# Patient Record
Sex: MALE | Race: WHITE | NOT HISPANIC OR LATINO | Employment: FULL TIME | URBAN - METROPOLITAN AREA
[De-identification: names, ages, dates, MRNs, and addresses within clinical notes are randomized per-mention and may not be internally consistent; named-entity substitution may affect disease eponyms.]

---

## 2017-07-10 ENCOUNTER — HOSPITAL ENCOUNTER (EMERGENCY)
Facility: HOSPITAL | Age: 32
Discharge: LEFT BEFORE DISCHARGE | End: 2017-07-10
Attending: EMERGENCY MEDICINE

## 2017-07-10 VITALS
HEART RATE: 72 BPM | OXYGEN SATURATION: 99 % | RESPIRATION RATE: 19 BRPM | WEIGHT: 200 LBS | SYSTOLIC BLOOD PRESSURE: 183 MMHG | TEMPERATURE: 98.2 F | DIASTOLIC BLOOD PRESSURE: 95 MMHG

## 2017-07-10 DIAGNOSIS — L02.91 ABSCESS OF SKIN WITH LYMPHANGITIS: Primary | ICD-10-CM

## 2017-07-10 LAB
ANION GAP SERPL CALCULATED.3IONS-SCNC: 7 MMOL/L (ref 4–13)
BASOPHILS # BLD AUTO: 0.03 THOUSANDS/ΜL (ref 0–0.1)
BASOPHILS NFR BLD AUTO: 0 % (ref 0–1)
BUN SERPL-MCNC: 13 MG/DL (ref 5–25)
CALCIUM SERPL-MCNC: 8.9 MG/DL (ref 8.3–10.1)
CHLORIDE SERPL-SCNC: 104 MMOL/L (ref 100–108)
CO2 SERPL-SCNC: 30 MMOL/L (ref 21–32)
CREAT SERPL-MCNC: 0.99 MG/DL (ref 0.6–1.3)
EOSINOPHIL # BLD AUTO: 0.1 THOUSAND/ΜL (ref 0–0.61)
EOSINOPHIL NFR BLD AUTO: 1 % (ref 0–6)
ERYTHROCYTE [DISTWIDTH] IN BLOOD BY AUTOMATED COUNT: 13.7 % (ref 11.6–15.1)
GFR SERPL CREATININE-BSD FRML MDRD: >60 ML/MIN/1.73SQ M
GLUCOSE SERPL-MCNC: 94 MG/DL (ref 65–140)
HCT VFR BLD AUTO: 44.6 % (ref 36.5–49.3)
HGB BLD-MCNC: 15.5 G/DL (ref 12–17)
LYMPHOCYTES # BLD AUTO: 1.75 THOUSANDS/ΜL (ref 0.6–4.47)
LYMPHOCYTES NFR BLD AUTO: 18 % (ref 14–44)
MCH RBC QN AUTO: 30.2 PG (ref 26.8–34.3)
MCHC RBC AUTO-ENTMCNC: 34.8 G/DL (ref 31.4–37.4)
MCV RBC AUTO: 87 FL (ref 82–98)
MONOCYTES # BLD AUTO: 0.88 THOUSAND/ΜL (ref 0.17–1.22)
MONOCYTES NFR BLD AUTO: 9 % (ref 4–12)
NEUTROPHILS # BLD AUTO: 6.95 THOUSANDS/ΜL (ref 1.85–7.62)
NEUTS SEG NFR BLD AUTO: 72 % (ref 43–75)
NRBC BLD AUTO-RTO: 0 /100 WBCS
PLATELET # BLD AUTO: 183 THOUSANDS/UL (ref 149–390)
PMV BLD AUTO: 10.3 FL (ref 8.9–12.7)
POTASSIUM SERPL-SCNC: 3.9 MMOL/L (ref 3.5–5.3)
RBC # BLD AUTO: 5.14 MILLION/UL (ref 3.88–5.62)
SODIUM SERPL-SCNC: 141 MMOL/L (ref 136–145)
WBC # BLD AUTO: 9.71 THOUSAND/UL (ref 4.31–10.16)

## 2017-07-10 PROCEDURE — 99283 EMERGENCY DEPT VISIT LOW MDM: CPT

## 2017-07-10 PROCEDURE — 80048 BASIC METABOLIC PNL TOTAL CA: CPT | Performed by: EMERGENCY MEDICINE

## 2017-07-10 PROCEDURE — 87040 BLOOD CULTURE FOR BACTERIA: CPT | Performed by: EMERGENCY MEDICINE

## 2017-07-10 PROCEDURE — 85025 COMPLETE CBC W/AUTO DIFF WBC: CPT | Performed by: EMERGENCY MEDICINE

## 2017-07-10 PROCEDURE — 36415 COLL VENOUS BLD VENIPUNCTURE: CPT | Performed by: EMERGENCY MEDICINE

## 2017-07-10 RX ORDER — NAPROXEN 500 MG/1
500 TABLET ORAL 2 TIMES DAILY WITH MEALS
Qty: 30 TABLET | Refills: 0 | Status: SHIPPED | OUTPATIENT
Start: 2017-07-10 | End: 2019-02-15

## 2017-07-10 RX ORDER — CEPHALEXIN 500 MG/1
500 CAPSULE ORAL EVERY 6 HOURS SCHEDULED
Qty: 40 CAPSULE | Refills: 0 | Status: SHIPPED | OUTPATIENT
Start: 2017-07-10 | End: 2017-07-20

## 2017-07-10 RX ADMIN — CEFAZOLIN SODIUM 2000 MG: 2 SOLUTION INTRAVENOUS at 15:02

## 2017-07-10 NOTE — ED PROVIDER NOTES
History  Chief Complaint   Patient presents with    Finger Pain     Redness and swelling at first digit on L hand with red line up L arm to chest   First noticed this morning  38-year-old male presents for the evaluation of moderate left thumb pain with redness and swelling  The patient has associated redness up his arm to the level of his armpit  He states that the thumb is been painful and red for 2 days and then the right arm streaking began today  Nothing has made the symptoms better or worse  The patient has some associated chills while he was at work, which he attributes to working and 50° environment    The patient has a history of having a paronychia and lymphangitis in that arm twice before which is required admission and IV antibiotics, both times he signed out [address]  During my initial history, the patient stated that he just wanted me to cut open the area of his thumb, give him some pain pills, and give him oral antibiotics because he can't stay in the hospital because he has to go to work tomorrow  I informed the patient about my concern about rapidly spreading lymphangitis and the development of sepsis and potential for limb or life-threatening infection  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 and I informed him that a severe infection that is not properly treated could result in disability preventing him from working  History provided by:  Patient      None       Past Medical History:   Diagnosis Date    ADHD (attention deficit hyperactivity disorder)     Anxiety     Bipolar 1 disorder        Past Surgical History:   Procedure Laterality Date    CYST REMOVAL      tailbone       History reviewed  No pertinent family history  I have reviewed and agree with the history as documented      Social History   Substance Use Topics    Smoking status: Current Every Day Smoker    Smokeless tobacco: Not on file    Alcohol use No Review of Systems   Constitutional: Positive for chills  Negative for fever  Musculoskeletal: Positive for joint swelling and myalgias  Skin: Positive for rash  All other systems reviewed and are negative  Physical Exam  ED Triage Vitals   Temperature Pulse Respirations Blood Pressure SpO2   07/10/17 1409 07/10/17 1409 07/10/17 1409 07/10/17 1409 07/10/17 1409   98 2 °F (36 8 °C) 82 18 153/84 96 %      Temp Source Heart Rate Source Patient Position BP Location FiO2 (%)   07/10/17 1409 07/10/17 1506 07/10/17 1506 07/10/17 1506 --   Temporal Monitor Sitting Left arm       Pain Score       07/10/17 1409       9           Physical Exam   Constitutional: He is oriented to person, place, and time  He appears well-developed and well-nourished  No distress  HENT:   Head: Normocephalic and atraumatic  Right Ear: External ear normal    Left Ear: External ear normal    Mouth/Throat: No oropharyngeal exudate  Eyes: EOM are normal  Pupils are equal, round, and reactive to light  No scleral icterus  Neck: Normal range of motion  Neck supple  Cardiovascular: Normal rate, regular rhythm and normal heart sounds  Pulmonary/Chest: Effort normal and breath sounds normal  No respiratory distress  Abdominal: Soft  Bowel sounds are normal  There is no tenderness  There is no rebound and no guarding  Musculoskeletal: Normal range of motion  Lymphadenopathy:     He has axillary adenopathy (left)  Left axillary: Lateral adenopathy present  Neurological: He is alert and oriented to person, place, and time  Skin: Skin is warm and dry  Rash noted  Psychiatric: He has a normal mood and affect  Nursing note and vitals reviewed        ED Medications  Medications   ceFAZolin (ANCEF) IVPB (premix) 2,000 mg (0 mg Intravenous Stopped 7/10/17 1532)       Diagnostic Studies  Labs Reviewed   CBC AND DIFFERENTIAL - Normal       Result Value Ref Range Status    WBC 9 71  4 31 - 10 16 Thousand/uL Final RBC 5 14  3 88 - 5 62 Million/uL Final    Hemoglobin 15 5  12 0 - 17 0 g/dL Final    Hematocrit 44 6  36 5 - 49 3 % Final    MCV 87  82 - 98 fL Final    MCH 30 2  26 8 - 34 3 pg Final    MCHC 34 8  31 4 - 37 4 g/dL Final    RDW 13 7  11 6 - 15 1 % Final    MPV 10 3  8 9 - 12 7 fL Final    Platelets 115  466 - 390 Thousands/uL Final    nRBC 0  /100 WBCs Final    Neutrophils Relative 72  43 - 75 % Final    Lymphocytes Relative 18  14 - 44 % Final    Monocytes Relative 9  4 - 12 % Final    Eosinophils Relative 1  0 - 6 % Final    Basophils Relative 0  0 - 1 % Final    Neutrophils Absolute 6 95  1 85 - 7 62 Thousands/µL Final    Lymphocytes Absolute 1 75  0 60 - 4 47 Thousands/µL Final    Monocytes Absolute 0 88  0 17 - 1 22 Thousand/µL Final    Eosinophils Absolute 0 10  0 00 - 0 61 Thousand/µL Final    Basophils Absolute 0 03  0 00 - 0 10 Thousands/µL Final   BLOOD CULTURE   BLOOD CULTURE   BASIC METABOLIC PANEL    Sodium 433  136 - 145 mmol/L Final    Potassium 3 9  3 5 - 5 3 mmol/L Final    Chloride 104  100 - 108 mmol/L Final    CO2 30  21 - 32 mmol/L Final    Anion Gap 7  4 - 13 mmol/L Final    BUN 13  5 - 25 mg/dL Final    Creatinine 0 99  0 60 - 1 30 mg/dL Final    Comment: Standardized to IDMS reference method    Glucose 94  65 - 140 mg/dL Final    Comment: If the patient is fasting, the ADA then defines impaired fasting glucose as > 100 mg/dL and diabetes as > or equal to 123 mg/dL  Calcium 8 9  8 3 - 10 1 mg/dL Final    eGFR >60 0  ml/min/1 73sq m Final    Narrative:     National Kidney Disease Education Program recommendations are as follows:  GFR calculation is accurate only with a steady state creatinine  Chronic Kidney disease less than 60 ml/min/1 73 sq  meters  Kidney failure less than 15 ml/min/1 73 sq  meters         No orders to display       Procedures  Procedures      Phone Contacts  ED Phone Contact    ED Course  ED Course   Chandrakant Radha Feliciano Documentation   Comment Time   Patient stable upon reevaluation  The patient is still insistent upon signing out [address] due to financial constraints of work  I discussed the risks of signing out AMA due to rapidly spreading lymphangitis in the risk of sepsis, death, loss of limb which could result in loss of gainful work and loss of his current lifestyle  The patient acknowledges these risks and is still willing to sign out AMA understanding that he is welcome to return at any time for treatment  I will prescribe the patient oral Keflex to continue to treat his lymphangitis and he understands that this is suboptimal treatment and may not result in cure 07/10 1620                               MDM  Number of Diagnoses or Management Options  Abscess of skin with lymphangitis: new and requires workup  Diagnosis management comments: 24-year-old male presents for evaluation of abscess to the left thumb with red streaking up the arm to the level of the armpit  The patient has had some associated chills  I discussed the diagnosis of left thumb abscess with developing paronychia  The paronychia is not fluctuant and amenable to drainage  As there are several small pockets to the medial aspect of the thumb  The patient has impressive lymphangitic streak up his arm and I advised patient of the need for admission due to the rapid onset of the lymphangitis as he reports this is occurred in the past 4 hours  Patient is understanding of the severe concern that I have regarding his infection and the need for treatment  Despite multiple attempts to convince the patient to stay for IV antibiotics, the patient still signed out AMA despite my offering to call his boss  The patient is understanding that he can return to the hospital or the emergency department at any time for continued treatment  Precautions were given  The patient states that he has had this twice before and left AMA twice before       The patient (and any family present) verbalized understanding of the discharge instructions and warnings that would necessitate return to the Emergency Department  All questions were answered prior to the patient leaving AGAINST MEDICAL ADVICE         Amount and/or Complexity of Data Reviewed  Clinical lab tests: ordered and reviewed  Review and summarize past medical records: yes      CritCare Time    Disposition  Final diagnoses:   Abscess of skin with lymphangitis     ED Disposition     ED Disposition Condition Comment    AMA  Date: 7/10/2017  Patient: Carrie Galloway  Admitted: 7/10/2017  2:15 PM  Attending Provider: Jennifer Penaloza DO    Carrie Galloway  or his authorized caregiver has made the decision for the patient to leave the emergency department against the adv ice of the emergency department staff  He or his authorized caregiver has been informed and understands the inherent risks, including death, sepsis, worsening infection, loss of current lifestyle loss of gainful employment  He or his authorized caregive r has decided to accept the responsibility for this decision  Carrie Galloway  and all necessary parties have been advised that he may return for further evaluation or treatment  His condition at time of discharge was stable    Carrie Galloway  had  current vital signs as follows:  /93   Pulse 61   Temp 98 2 °F (36 8 °C) (Temporal)   Resp 16   Wt 90 7 kg (200 lb)         Follow-up Information     Follow up With Specialties Details Why Contact Info Additional [address] [facility]  Schedule an appointment as soon as possible for a visit For further evaluation [address] [facility] [address]  [address] [facility] Emergency Department Emergency Medicine Go to For further evaluation, if not improved [facility] [facility]  [phone] [address] [place] [address] ED, [address] [place] [address] , [place], [place], [address]        Discharge Medication List as of 7/10/2017  4:24 PM      START taking these medications    Details   cephalexin (KEFLEX) 500 mg capsule Take 1 capsule by mouth every 6 (six) hours for 10 days, Starting Mon 7/10/2017, Until Thu 7/20/2017, Print      naproxen (NAPROSYN) 500 mg tablet Take 1 tablet by mouth 2 (two) times a day with meals, Starting Mon 7/10/2017, Print           No discharge procedures on file      ED Provider  Electronically Signed by       Brittany Farfan DO  07/10/17 7081

## 2017-07-10 NOTE — DISCHARGE INSTRUCTIONS
Abscess   WHAT YOU NEED TO KNOW:   A warm compress may help your abscess drain  Your healthcare provider may make a cut in the abscess so it can drain  You may need surgery to remove an abscess that is on your hands or buttocks  DISCHARGE INSTRUCTIONS:   Return to the emergency department if:   · The area around your abscess becomes very painful, warm, or has red streaks  · You have a fever and chills  · Your heart is beating faster than usual      · You feel faint or confused  Contact your healthcare provider if:   · Your abscess gets bigger or does not get better  · Your abscess returns  · You have questions or concerns about your condition or care  Medicines: You may  need any of the following:  · Antibiotics  help treat a bacterial infection  · Acetaminophen  decreases pain and fever  It is available without a doctor's order  Ask how much to take and how often to take it  Follow directions  Acetaminophen can cause liver damage if not taken correctly  · NSAIDs , such as ibuprofen, help decrease swelling, pain, and fever  This medicine is available with or without a doctor's order  NSAIDs can cause stomach bleeding or kidney problems in certain people  If you take blood thinner medicine, always ask your healthcare provider if NSAIDs are safe for you  Always read the medicine label and follow directions  · Take your medicine as directed  Contact your healthcare provider if you think your medicine is not helping or if you have side effects  Tell him or her if you are allergic to any medicine  Keep a list of the medicines, vitamins, and herbs you take  Include the amounts, and when and why you take them  Bring the list or the pill bottles to follow-up visits  Carry your medicine list with you in case of an emergency  Self-care:   · Apply a warm compress to your abscess  This will help it open and drain  Wet a washcloth in warm, but not hot, water  Apply the compress for 10 minutes  Repeat this 4 times each day  Do not  press on an abscess or try to open it with a needle  You may push the bacteria deeper or into your blood  · Do not share your clothes, towels, or sheets with anyone  This can spread the infection to others  · Wash your hands often  This can help prevent the spread of germs  Use soap and water or an alcohol-based hand rub  Care for your wound after it is drained:   · Care for your wound as directed  If your healthcare provider says it is okay, carefully remove the bandage and gauze packing  You may need to soak the gauze to get it out of your wound  Clean your wound and the area around it as directed  Dry the area and put on new, clean bandages  Change your bandages when they get wet or dirty  · Ask your healthcare provider how to change the gauze in your wound  Keep track of how many pieces of gauze are placed inside the wound  Do not put too much packing in the wound  Do not pack the gauze too tightly in your wound  Follow up with your healthcare provider in 1 to 3 days: You may need to have your packing removed or your bandage changed  Write down your questions so you remember to ask them during your visits  © 2017 [facility] Information is for End User's use only and may not be sold, redistributed or otherwise used for commercial purposes  All illustrations and images included in CareNotes® are the copyrighted property of A D A M , Inc  or [employer]  The above information is an  only  It is not intended as medical advice for individual conditions or treatments  Talk to your doctor, nurse or pharmacist before following any medical regimen to see if it is safe and effective for you

## 2017-07-10 NOTE — ED NOTES
Pt states he does not wish to stay because it is his birthday tomorrow, and he has to work        Christine Sepulveda RN  07/10/17 6265

## 2017-07-15 LAB
BACTERIA BLD CULT: NORMAL
BACTERIA BLD CULT: NORMAL

## 2019-02-15 ENCOUNTER — HOSPITAL ENCOUNTER (EMERGENCY)
Facility: HOSPITAL | Age: 34
Discharge: HOME | End: 2019-02-15
Attending: EMERGENCY MEDICINE

## 2019-02-15 VITALS
RESPIRATION RATE: 16 BRPM | HEART RATE: 78 BPM | OXYGEN SATURATION: 98 % | DIASTOLIC BLOOD PRESSURE: 96 MMHG | TEMPERATURE: 98.1 F | SYSTOLIC BLOOD PRESSURE: 166 MMHG

## 2019-02-15 DIAGNOSIS — S61.210A LACERATION OF RIGHT INDEX FINGER WITHOUT FOREIGN BODY WITHOUT DAMAGE TO NAIL, INITIAL ENCOUNTER: Primary | ICD-10-CM

## 2019-02-15 PROCEDURE — 90715 TDAP VACCINE 7 YRS/> IM: CPT | Performed by: PHYSICIAN ASSISTANT

## 2019-02-15 PROCEDURE — 99282 EMERGENCY DEPT VISIT SF MDM: CPT

## 2019-02-15 PROCEDURE — 90471 IMMUNIZATION ADMIN: CPT

## 2019-02-15 RX ORDER — CEPHALEXIN 500 MG/1
500 CAPSULE ORAL 4 TIMES DAILY
Qty: 28 CAPSULE | Refills: 0 | Status: SHIPPED | OUTPATIENT
Start: 2019-02-15 | End: 2019-02-22

## 2019-02-15 RX ORDER — BACITRACIN, NEOMYCIN, POLYMYXIN B 400; 3.5; 5 [USP'U]/G; MG/G; [USP'U]/G
1 OINTMENT TOPICAL ONCE
Status: COMPLETED | OUTPATIENT
Start: 2019-02-15 | End: 2019-02-15

## 2019-02-15 RX ADMIN — TETANUS TOXOID, REDUCED DIPHTHERIA TOXOID AND ACELLULAR PERTUSSIS VACCINE, ADSORBED 0.5 ML: 5; 2.5; 8; 8; 2.5 SUSPENSION INTRAMUSCULAR at 09:43

## 2019-02-15 RX ADMIN — BACITRACIN, NEOMYCIN, POLYMYXIN B 1 SMALL APPLICATION: 400; 3.5; 5 OINTMENT TOPICAL at 10:30

## 2019-02-15 NOTE — DISCHARGE INSTRUCTIONS
DISCHARGE INSTRUCTIONS:    FOLLOW UP WITH YOUR PRIMARY CARE PROVIDER OR THE [address] [place] [address]  MAKE AN APPOINTMENT TO BE SEEN  TAKE TYLENOL OR MOTRIN FOR PAIN  TAKE KEFLEX AS PRESCRIBED  IF RASH, SHORTNESS OF BREATH OR TROUBLE SWALLOWING, STOP TAKING THE MEDICATION AND BE SEEN  REST AND KEEP AREA CLEAN  APPLY NEOSPORIN TO THE AREA  WATCH FOR SIGNS OF INFECTION: REDNESS, SWELLING OR DISCHARGE      IF SYMPTOMS WORSEN OR NEW SYMPTOMS ARISE, RETURN TO THE ER TO BE SEEN

## 2019-02-15 NOTE — ED PROVIDER NOTES
History  Chief Complaint   Patient presents with    Finger Laceration     Reports was cutting up a box, and lacerated index finger of right hand  Pt unsure of last tetanus       33y  o male with PMH of ADHD, anxiety and bipolar presents to the ER for laceration to his right index finger since 09:00 today  Patient states he was at home cutting boxes when he accidentally cut his finger  He denies taking any medication for pain  He denies pain  His last tetanus is unknown  He denies fever, chills, chest pain, dyspnea, N/V/D, abdominal pain, weakness or paresthesias  History provided by:  Patient   used: No        None       Past Medical History:   Diagnosis Date    ADHD (attention deficit hyperactivity disorder)     Anxiety     Bipolar 1 disorder ([facility] [address] )        Past Surgical History:   Procedure Laterality Date    CYST REMOVAL      tailbone       History reviewed  No pertinent family history  I have reviewed and agree with the history as documented  Social History     Tobacco Use    Smoking status: Current Every Day Smoker     Packs/day: 1 00     Types: Cigarettes    Smokeless tobacco: Never Used   Substance Use Topics    Alcohol use: No    Drug use: Yes     Types: Marijuana        Review of Systems   Constitutional: Negative for chills and fever  Eyes: Negative for redness  Respiratory: Negative for shortness of breath  Cardiovascular: Negative for chest pain  Gastrointestinal: Negative for abdominal pain, diarrhea, nausea and vomiting  Musculoskeletal: Negative for neck stiffness  Skin: Positive for wound (laceration right index)  Negative for rash  Allergic/Immunologic: Negative for food allergies  Neurological: Negative for weakness and numbness  Physical Exam  Physical Exam   Constitutional:  Non-toxic appearance  No distress  HENT:   Head: Normocephalic and atraumatic     Right Ear: Tympanic membrane, external ear and ear canal normal  No drainage, swelling or tenderness  No foreign bodies  Tympanic membrane is not erythematous  No hemotympanum  Left Ear: Tympanic membrane, external ear and ear canal normal  No drainage, swelling or tenderness  No foreign bodies  Tympanic membrane is not erythematous  No hemotympanum  Nose: Nose normal    Mouth/Throat: Uvula is midline, oropharynx is clear and moist and mucous membranes are normal  No uvula swelling  No posterior oropharyngeal edema, posterior oropharyngeal erythema or tonsillar abscesses  No tonsillar exudate  Neck: Normal range of motion and phonation normal  Neck supple  No tracheal deviation present  Cardiovascular: Normal rate, regular rhythm, S1 normal, S2 normal and normal heart sounds  Exam reveals no gallop and no friction rub  No murmur heard  Pulmonary/Chest: Effort normal and breath sounds normal  No respiratory distress  He has no decreased breath sounds  He has no wheezes  He has no rhonchi  He has no rales  He exhibits no tenderness  Musculoskeletal:        Right hand: He exhibits laceration  He exhibits normal range of motion, no tenderness, no bony tenderness, normal capillary refill and no deformity  Normal sensation noted  Normal strength noted  Hands:  Neurological: He is alert  GCS eye subscore is 4  GCS verbal subscore is 5  GCS motor subscore is 6  Skin: Skin is warm and dry  No rash noted  Psychiatric: He has a normal mood and affect  Nursing note and vitals reviewed        Vital Signs  ED Triage Vitals [02/15/19 0914]   Temperature Pulse Respirations Blood Pressure SpO2   98 1 °F (36 7 °C) 78 16 166/96 98 %      Temp Source Heart Rate Source Patient Position - Orthostatic VS BP Location FiO2 (%)   Temporal Monitor Sitting Right arm --      Pain Score       No Pain           Vitals:    02/15/19 0914   BP: 166/96   Pulse: 78   Patient Position - Orthostatic VS: Sitting       Visual Acuity      ED Medications  Medications   tetanus-diphtheria-acellular pertussis (BOOSTRIX) IM injection 0 5 mL (0 5 mL Intramuscular Given 2/15/19 3943)   neomycin-bacitracin-polymyxin b (NEOSPORIN) ointment 1 small application (1 small application Topical Given 2/15/19 8982)       Diagnostic Studies  Results Reviewed     None                 No orders to display              Procedures  Orthopedic Injury  Date/Time: 2/15/2019 10:23 AM  Performed by: Nina Groves PA-C  Authorized by: Nina Groves PA-C     Patient Location:  ED  Other Assisting Provider: Yes (comment) (ED RN)    Verbal consent obtained?: Yes    Consent given by:  Patient  Patient states understanding of procedure being performed: Yes    Radiology Images displayed and confirmed  If images not available, report reviewed: Yes    Patient identity confirmed:  Arm band  Injury location:  Finger  Location details:  Right index finger  Injury type: Soft tissue  Neurovascular status: Neurovascularly intact    Distal perfusion: normal    Neurological function: normal    Range of motion: normal    Local anesthesia used?: No    General anesthesia used?: No    Immobilization:  Splint  Splint type:  Finger splint, static  Supplies used:  Aluminum splint  Neurovascular status: Neurovascularly intact    Distal perfusion: normal    Neurological function: normal    Range of motion: unchanged    Patient tolerance:  Patient tolerated the procedure well with no immediate complications           Phone Contacts  ED Phone Contact    ED Course                               MDM  Number of Diagnoses or Management Options  Laceration of right index finger without foreign body without damage to nail, initial encounter: new and requires workup  Diagnosis management comments: DDX consists of but not limited to: laceration, abrasion, fracture    Patient does not want the area sutured  Informed patient of risks of not suturing the area  Patient would like a splint placed without sutures  Will apply neosporin, a dressing and splint      At discharge, I instructed the patient to:  -follow up with pcp  -take Tylenol or Motrin for pain  -take Keflex as prescribed  -keep the area clean  -apply neosporin to the area  -watch for signs of infection: redness, swelling or discharge  -return to the ER if symptoms worsened or new symptoms arose  Patient agreed to this plan and was stable at time of discharge  Patient Progress  Patient progress: stable      Disposition  Final diagnoses:   Laceration of right index finger without foreign body without damage to nail, initial encounter     Time reflects when diagnosis was documented in both MDM as applicable and the Disposition within this note     Time User Action Codes Description Comment    2/15/2019 10:24 AM Leonora MERCADO Add [[id number]] Laceration of right index finger without foreign body without damage to nail, initial encounter       ED Disposition     ED Disposition Condition Date/Time Comment    Discharge Stable Fri Feb 15, 2019 10:24 AM Dilip Milan  discharge to home/self care  Follow-up Information     Follow up With Specialties Details Why Contact Info Additional [address] [facility] Medicine Schedule an appointment as soon as possible for a visit  As needed [address] [place] [address]  [id number]  [address] [place] [address],[address] [facility]  [facility], [place], [address] [place] [address], [id number]          Discharge Medication List as of 2/15/2019 10:26 AM      START taking these medications    Details   cephalexin (KEFLEX) 500 mg capsule Take 1 capsule (500 mg total) by mouth 4 (four) times a day for 7 days, Starting Fri 2/15/2019, Until Fri 2/22/2019, Normal           No discharge procedures on file      ED Provider  Electronically Signed by           Jimenez Yip PA-C  02/15/19 9310

## 2020-10-20 ENCOUNTER — HOSPITAL ENCOUNTER (EMERGENCY)
Facility: HOSPITAL | Age: 35
Discharge: HOME | End: 2020-10-20
Attending: EMERGENCY MEDICINE | Admitting: EMERGENCY MEDICINE

## 2020-10-20 VITALS
HEART RATE: 84 BPM | WEIGHT: 222.22 LBS | SYSTOLIC BLOOD PRESSURE: 186 MMHG | BODY MASS INDEX: 34.81 KG/M2 | TEMPERATURE: 98.3 F | OXYGEN SATURATION: 98 % | DIASTOLIC BLOOD PRESSURE: 96 MMHG | RESPIRATION RATE: 18 BRPM

## 2020-10-20 DIAGNOSIS — J02.9 ACUTE PHARYNGITIS: Primary | ICD-10-CM

## 2020-10-20 LAB — S PYO DNA THROAT QL NAA+PROBE: NORMAL

## 2020-10-20 PROCEDURE — 99283 EMERGENCY DEPT VISIT LOW MDM: CPT

## 2020-10-20 PROCEDURE — 99284 EMERGENCY DEPT VISIT MOD MDM: CPT | Performed by: PHYSICIAN ASSISTANT

## 2020-10-20 PROCEDURE — 87651 STREP A DNA AMP PROBE: CPT | Performed by: PHYSICIAN ASSISTANT

## 2020-10-20 RX ADMIN — DEXAMETHASONE SODIUM PHOSPHATE 10 MG: 10 INJECTION, SOLUTION INTRAMUSCULAR; INTRAVENOUS at 08:30

## 2023-06-08 ENCOUNTER — HOSPITAL ENCOUNTER (EMERGENCY)
Facility: HOSPITAL | Age: 38
Discharge: HOME | End: 2023-06-08
Attending: EMERGENCY MEDICINE | Admitting: EMERGENCY MEDICINE

## 2023-06-08 ENCOUNTER — APPOINTMENT (EMERGENCY)
Dept: CT IMAGING | Facility: HOSPITAL | Age: 38
End: 2023-06-08

## 2023-06-08 VITALS
TEMPERATURE: 98.4 F | HEART RATE: 83 BPM | WEIGHT: 218.7 LBS | DIASTOLIC BLOOD PRESSURE: 103 MMHG | OXYGEN SATURATION: 97 % | SYSTOLIC BLOOD PRESSURE: 199 MMHG | BODY MASS INDEX: 34.25 KG/M2 | RESPIRATION RATE: 18 BRPM

## 2023-06-08 DIAGNOSIS — E87.6 HYPOKALEMIA: ICD-10-CM

## 2023-06-08 DIAGNOSIS — K29.70 GASTRITIS: ICD-10-CM

## 2023-06-08 DIAGNOSIS — R11.2 NAUSEA AND VOMITING: Primary | ICD-10-CM

## 2023-06-08 DIAGNOSIS — R03.0 ELEVATED BLOOD PRESSURE READING: ICD-10-CM

## 2023-06-08 LAB
2HR DELTA HS TROPONIN: 2 NG/L
ALBUMIN SERPL BCP-MCNC: 4.9 G/DL (ref 3.5–5)
ALP SERPL-CCNC: 68 U/L (ref 34–104)
ALT SERPL W P-5'-P-CCNC: 45 U/L (ref 7–52)
ANION GAP SERPL CALCULATED.3IONS-SCNC: 17 MMOL/L (ref 4–13)
AST SERPL W P-5'-P-CCNC: 38 U/L (ref 13–39)
ATRIAL RATE: 65 BPM
ATRIAL RATE: 75 BPM
BACTERIA UR QL AUTO: NORMAL /HPF
BASOPHILS # BLD AUTO: 0.01 THOUSANDS/ÂΜL (ref 0–0.1)
BASOPHILS NFR BLD AUTO: 0 % (ref 0–1)
BILIRUB SERPL-MCNC: 0.39 MG/DL (ref 0.2–1)
BILIRUB UR QL STRIP: NEGATIVE
BUN SERPL-MCNC: 16 MG/DL (ref 5–25)
CALCIUM SERPL-MCNC: 9.9 MG/DL (ref 8.4–10.2)
CARDIAC TROPONIN I PNL SERPL HS: 13 NG/L
CARDIAC TROPONIN I PNL SERPL HS: 15 NG/L
CHLORIDE SERPL-SCNC: 96 MMOL/L (ref 96–108)
CLARITY UR: CLEAR
CO2 SERPL-SCNC: 25 MMOL/L (ref 21–32)
COLOR UR: YELLOW
CREAT SERPL-MCNC: 1.04 MG/DL (ref 0.6–1.3)
EOSINOPHIL # BLD AUTO: 0 THOUSAND/ÂΜL (ref 0–0.61)
EOSINOPHIL NFR BLD AUTO: 0 % (ref 0–6)
ERYTHROCYTE [DISTWIDTH] IN BLOOD BY AUTOMATED COUNT: 13.2 % (ref 11.6–15.1)
GFR SERPL CREATININE-BSD FRML MDRD: 90 ML/MIN/1.73SQ M
GLUCOSE SERPL-MCNC: 132 MG/DL (ref 65–140)
GLUCOSE UR STRIP-MCNC: NEGATIVE MG/DL
HCT VFR BLD AUTO: 50.1 % (ref 36.5–49.3)
HGB BLD-MCNC: 17.3 G/DL (ref 12–17)
HGB UR QL STRIP.AUTO: NEGATIVE
IMM GRANULOCYTES # BLD AUTO: 0.02 THOUSAND/UL (ref 0–0.2)
IMM GRANULOCYTES NFR BLD AUTO: 0 % (ref 0–2)
KETONES UR STRIP-MCNC: ABNORMAL MG/DL
LEUKOCYTE ESTERASE UR QL STRIP: NEGATIVE
LIPASE SERPL-CCNC: 78 U/L (ref 11–82)
LYMPHOCYTES # BLD AUTO: 1.14 THOUSANDS/ÂΜL (ref 0.6–4.47)
LYMPHOCYTES NFR BLD AUTO: 16 % (ref 14–44)
MCH RBC QN AUTO: 29 PG (ref 26.8–34.3)
MCHC RBC AUTO-ENTMCNC: 34.5 G/DL (ref 31.4–37.4)
MCV RBC AUTO: 84 FL (ref 82–98)
MONOCYTES # BLD AUTO: 0.63 THOUSAND/ÂΜL (ref 0.17–1.22)
MONOCYTES NFR BLD AUTO: 9 % (ref 4–12)
NEUTROPHILS # BLD AUTO: 5.43 THOUSANDS/ÂΜL (ref 1.85–7.62)
NEUTS SEG NFR BLD AUTO: 75 % (ref 43–75)
NITRITE UR QL STRIP: NEGATIVE
NON-SQ EPI CELLS URNS QL MICRO: NORMAL /HPF
NRBC BLD AUTO-RTO: 0 /100 WBCS
P AXIS: 55 DEGREES
P AXIS: 62 DEGREES
PH UR STRIP.AUTO: 8.5 [PH] (ref 4.5–8)
PLATELET # BLD AUTO: 197 THOUSANDS/UL (ref 149–390)
PMV BLD AUTO: 10.5 FL (ref 8.9–12.7)
POTASSIUM SERPL-SCNC: 3 MMOL/L (ref 3.5–5.3)
PR INTERVAL: 148 MS
PR INTERVAL: 158 MS
PROT SERPL-MCNC: 8.3 G/DL (ref 6.4–8.4)
PROT UR STRIP-MCNC: ABNORMAL MG/DL
QRS AXIS: 87 DEGREES
QRS AXIS: 88 DEGREES
QRSD INTERVAL: 96 MS
QRSD INTERVAL: 98 MS
QT INTERVAL: 434 MS
QT INTERVAL: 444 MS
QTC INTERVAL: 461 MS
QTC INTERVAL: 484 MS
RBC # BLD AUTO: 5.97 MILLION/UL (ref 3.88–5.62)
RBC #/AREA URNS AUTO: NORMAL /HPF
SODIUM SERPL-SCNC: 138 MMOL/L (ref 135–147)
SP GR UR STRIP.AUTO: 1.01 (ref 1–1.03)
T WAVE AXIS: 26 DEGREES
T WAVE AXIS: 37 DEGREES
TSH SERPL DL<=0.05 MIU/L-ACNC: 0.57 UIU/ML (ref 0.45–4.5)
UROBILINOGEN UR QL STRIP.AUTO: 0.2 E.U./DL
VENTRICULAR RATE: 65 BPM
VENTRICULAR RATE: 75 BPM
WBC # BLD AUTO: 7.23 THOUSAND/UL (ref 4.31–10.16)
WBC #/AREA URNS AUTO: NORMAL /HPF

## 2023-06-08 PROCEDURE — 93010 ELECTROCARDIOGRAM REPORT: CPT | Performed by: STUDENT IN AN ORGANIZED HEALTH CARE EDUCATION/TRAINING PROGRAM

## 2023-06-08 PROCEDURE — 74177 CT ABD & PELVIS W/CONTRAST: CPT

## 2023-06-08 PROCEDURE — 84484 ASSAY OF TROPONIN QUANT: CPT | Performed by: EMERGENCY MEDICINE

## 2023-06-08 PROCEDURE — 84443 ASSAY THYROID STIM HORMONE: CPT | Performed by: EMERGENCY MEDICINE

## 2023-06-08 PROCEDURE — C9113 INJ PANTOPRAZOLE SODIUM, VIA: HCPCS | Performed by: EMERGENCY MEDICINE

## 2023-06-08 PROCEDURE — 80053 COMPREHEN METABOLIC PANEL: CPT | Performed by: EMERGENCY MEDICINE

## 2023-06-08 PROCEDURE — 93010 ELECTROCARDIOGRAM REPORT: CPT

## 2023-06-08 PROCEDURE — 85025 COMPLETE CBC W/AUTO DIFF WBC: CPT | Performed by: EMERGENCY MEDICINE

## 2023-06-08 PROCEDURE — 93005 ELECTROCARDIOGRAM TRACING: CPT

## 2023-06-08 PROCEDURE — G1004 CDSM NDSC: HCPCS

## 2023-06-08 PROCEDURE — 81001 URINALYSIS AUTO W/SCOPE: CPT

## 2023-06-08 PROCEDURE — 36415 COLL VENOUS BLD VENIPUNCTURE: CPT | Performed by: EMERGENCY MEDICINE

## 2023-06-08 PROCEDURE — 83690 ASSAY OF LIPASE: CPT | Performed by: EMERGENCY MEDICINE

## 2023-06-08 RX ORDER — POTASSIUM CHLORIDE 20 MEQ/1
40 TABLET, EXTENDED RELEASE ORAL ONCE
Status: COMPLETED | OUTPATIENT
Start: 2023-06-08 | End: 2023-06-08

## 2023-06-08 RX ORDER — PANTOPRAZOLE SODIUM 20 MG/1
20 TABLET, DELAYED RELEASE ORAL DAILY
Qty: 30 TABLET | Refills: 0 | Status: SHIPPED | OUTPATIENT
Start: 2023-06-08 | End: 2023-07-08

## 2023-06-08 RX ORDER — ACETAMINOPHEN 325 MG/1
975 TABLET ORAL ONCE
Status: COMPLETED | OUTPATIENT
Start: 2023-06-08 | End: 2023-06-08

## 2023-06-08 RX ORDER — ONDANSETRON 4 MG/1
4 TABLET, ORALLY DISINTEGRATING ORAL EVERY 8 HOURS PRN
Qty: 10 TABLET | Refills: 0 | Status: SHIPPED | OUTPATIENT
Start: 2023-06-08

## 2023-06-08 RX ORDER — KETOROLAC TROMETHAMINE 30 MG/ML
15 INJECTION, SOLUTION INTRAMUSCULAR; INTRAVENOUS ONCE
Status: COMPLETED | OUTPATIENT
Start: 2023-06-08 | End: 2023-06-08

## 2023-06-08 RX ORDER — PANTOPRAZOLE SODIUM 40 MG/10ML
40 INJECTION, POWDER, LYOPHILIZED, FOR SOLUTION INTRAVENOUS ONCE
Status: COMPLETED | OUTPATIENT
Start: 2023-06-08 | End: 2023-06-08

## 2023-06-08 RX ORDER — ONDANSETRON 2 MG/ML
4 INJECTION INTRAMUSCULAR; INTRAVENOUS ONCE
Status: COMPLETED | OUTPATIENT
Start: 2023-06-08 | End: 2023-06-08

## 2023-06-08 RX ADMIN — KETOROLAC TROMETHAMINE 15 MG: 30 INJECTION, SOLUTION INTRAMUSCULAR; INTRAVENOUS at 15:20

## 2023-06-08 RX ADMIN — POTASSIUM CHLORIDE 40 MEQ: 1500 TABLET, EXTENDED RELEASE ORAL at 17:50

## 2023-06-08 RX ADMIN — PANTOPRAZOLE SODIUM 40 MG: 40 INJECTION, POWDER, FOR SOLUTION INTRAVENOUS at 15:25

## 2023-06-08 RX ADMIN — ONDANSETRON 4 MG: 2 INJECTION INTRAMUSCULAR; INTRAVENOUS at 15:24

## 2023-06-08 RX ADMIN — SODIUM CHLORIDE 1000 ML: 0.9 INJECTION, SOLUTION INTRAVENOUS at 15:38

## 2023-06-08 RX ADMIN — ACETAMINOPHEN 975 MG: 325 TABLET ORAL at 17:59

## 2023-06-08 RX ADMIN — SODIUM CHLORIDE 1000 ML: 0.9 INJECTION, SOLUTION INTRAVENOUS at 16:59

## 2023-06-08 RX ADMIN — IOHEXOL 100 ML: 350 INJECTION, SOLUTION INTRAVENOUS at 17:29

## 2023-06-08 NOTE — DISCHARGE INSTRUCTIONS
Zofran as needed for nausea/vomiting  Take Protonix as prescribed    Follow up with PCP and GI    Return to ER if any new/worsening symptoms including but not limited to intractable vomiting, pain, etc

## 2023-06-08 NOTE — ED PROVIDER NOTES
History  Chief Complaint   Patient presents with   • Vomiting     Vomiting and fever x2 days  Unable to keep anything down  Medication taken with no relief  41 yo M presenting for evaluation of 2-3 days of intractable vomiting  Numerous episodes of vomiting, unable to tolerate po, occasional pieces of red material he thought may be blood  Epigastric/LUQ discomfort and some LLQ  Passing flatus/small BMs, he attributes from not eating  Subjective fevers and sweating  C/o burning in throat/chest from vomiting  Denies CP, SOB, urinary complaints  Does not follow with doctors, possible h/o HTN but not on any medications  No abdominal SHx                       None       Past Medical History:   Diagnosis Date   • ADHD (attention deficit hyperactivity disorder)    • Anxiety    • Bipolar 1 disorder ([facility] [address] )        Past Surgical History:   Procedure Laterality Date   • CYST REMOVAL      tailbone       History reviewed  No pertinent family history  I have reviewed and agree with the history as documented  E-Cigarette/Vaping   • E-Cigarette Use Never User      E-Cigarette/Vaping Substances     Social History     Tobacco Use   • Smoking status: Every Day     Packs/day: 1 00     Types: Cigarettes   • Smokeless tobacco: Never   Vaping Use   • Vaping Use: Never used   Substance Use Topics   • Alcohol use: No   • Drug use: Yes     Types: Marijuana       Review of Systems    Physical Exam  Physical Exam  Vitals and nursing note reviewed  Constitutional:       Appearance: He is well-developed  HENT:      Head: Normocephalic and atraumatic  Nose: Nose normal    Eyes:      Conjunctiva/sclera: Conjunctivae normal    Cardiovascular:      Rate and Rhythm: Normal rate and regular rhythm  Heart sounds: Normal heart sounds  Pulmonary:      Effort: Pulmonary effort is normal  No respiratory distress  Breath sounds: Normal breath sounds  No stridor  No wheezing or rales  Chest:      Chest wall: No tenderness  Abdominal:      General: There is no distension  Palpations: Abdomen is soft  Tenderness: There is abdominal tenderness  There is no guarding or rebound  Comments: ttp epigastric/LUQ/LLQ, no rebound/guarding   Musculoskeletal:         General: No deformity  Cervical back: Normal range of motion and neck supple  Skin:     General: Skin is warm and dry  Findings: No rash  Neurological:      General: No focal deficit present  Mental Status: He is alert and oriented to person, place, and time  Motor: No abnormal muscle tone  Coordination: Coordination normal    Psychiatric:         Thought Content:  Thought content normal          Judgment: Judgment normal          Vital Signs  ED Triage Vitals   Temperature Pulse Respirations Blood Pressure SpO2   06/08/23 1557 06/08/23 1516 06/08/23 1516 06/08/23 1516 06/08/23 1516   98 4 °F (36 9 °C) 91 18 (!) 209/137 97 %      Temp Source Heart Rate Source Patient Position - Orthostatic VS BP Location FiO2 (%)   06/08/23 1557 06/08/23 1516 06/08/23 1516 06/08/23 1516 --   Oral Monitor Sitting Right arm       Pain Score       06/08/23 1520       6           Vitals:    06/08/23 1516 06/08/23 1752   BP: (!) 209/137 (!) 199/103   Pulse: 91 83   Patient Position - Orthostatic VS: Sitting Lying         Visual Acuity      ED Medications  Medications   sodium chloride 0 9 % bolus 1,000 mL (0 mL Intravenous Stopped 6/8/23 1700)   ondansetron (ZOFRAN) injection 4 mg (4 mg Intravenous Given 6/8/23 1524)   ketorolac (TORADOL) injection 15 mg (15 mg Intravenous Given 6/8/23 1520)   pantoprazole (PROTONIX) injection 40 mg (40 mg Intravenous Given 6/8/23 1525)   sodium chloride 0 9 % bolus 1,000 mL (0 mL Intravenous Stopped 6/8/23 1906)   potassium chloride (K-DUR,KLOR-CON) CR tablet 40 mEq (40 mEq Oral Given 6/8/23 1750)   iohexol (OMNIPAQUE) 350 MG/ML injection (SINGLE-DOSE) 100 mL (100 mL Intravenous Given 6/8/23 1729)   acetaminophen (TYLENOL) tablet 975 mg (975 mg Oral Given 6/8/23 1759)       Diagnostic Studies  Results Reviewed     Procedure Component Value Units Date/Time    HS Troponin I 2hr [[id number]]  (Normal) Collected: 06/08/23 1743    Lab Status: Final result Specimen: Blood from Arm, Right Updated: 06/08/23 1857     hs TnI 2hr 15 ng/L      Delta 2hr hsTnI 2 ng/L     Urine Microscopic [[id number]]  (Normal) Collected: 06/08/23 1750    Lab Status: Final result Specimen: Urine, Clean Catch Updated: 06/08/23 1849     RBC, UA 1-2 /hpf      WBC, UA None Seen /hpf      Epithelial Cells Occasional /hpf      Bacteria, UA None Seen /hpf     Urine Macroscopic, POC [[id number]]  (Abnormal) Collected: 06/08/23 1750    Lab Status: Final result Specimen: Urine Updated: 06/08/23 1751     Color, UA Yellow     Clarity, UA Clear     pH, UA 8 5     Leukocytes, UA Negative     Nitrite, UA Negative     Protein, UA 30 (1+) mg/dl      Glucose, UA Negative mg/dl      Ketones, UA 40 (2+) mg/dl      Urobilinogen, UA 0 2 E U /dl      Bilirubin, UA Negative     Occult Blood, UA Negative     Specific Gravity, UA 1 015    Narrative:      CLINITEK RESULT    Lipase [[id number]]  (Normal) Collected: 06/08/23 1531    Lab Status: Final result Specimen: Blood from Arm, Right Updated: 06/08/23 1715     Lipase 78 u/L     Comprehensive metabolic panel [[id number]]  (Abnormal) Collected: 06/08/23 1531    Lab Status: Final result Specimen: Blood from Arm, Right Updated: 06/08/23 1715     Sodium 138 mmol/L      Potassium 3 0 mmol/L      Chloride 96 mmol/L      CO2 25 mmol/L      ANION GAP 17 mmol/L      BUN 16 mg/dL      Creatinine 1 04 mg/dL      Glucose 132 mg/dL      Calcium 9 9 mg/dL      AST 38 U/L      ALT 45 U/L      Alkaline Phosphatase 68 U/L      Total Protein 8 3 g/dL      Albumin 4 9 g/dL      Total Bilirubin 0 39 mg/dL      eGFR 90 ml/min/1 73sq m     Narrative:      [place] guidelines for Chronic Kidney Disease (CKD):   •  Stage 1 with normal or high GFR (GFR > 90 mL/min/1 73 square meters)  •  Stage 2 Mild CKD (GFR = 60-89 mL/min/1 73 square meters)  •  Stage 3A Moderate CKD (GFR = 45-59 mL/min/1 73 square meters)  •  Stage 3B Moderate CKD (GFR = 30-44 mL/min/1 73 square meters)  •  Stage 4 Severe CKD (GFR = 15-29 mL/min/1 73 square meters)  •  Stage 5 End Stage CKD (GFR <15 mL/min/1 73 square meters)  Note: GFR calculation is accurate only with a steady state creatinine    TSH, 3rd generation with Free T4 reflex [[id number]]  (Normal) Collected: 06/08/23 1531    Lab Status: Final result Specimen: Blood from Arm, Right Updated: 06/08/23 1631     TSH 3RD GENERATON 0 571 uIU/mL     HS Troponin 0hr (reflex protocol) [[id number]]  (Normal) Collected: 06/08/23 1531    Lab Status: Final result Specimen: Blood from Arm, Right Updated: 06/08/23 1621     hs TnI 0hr 13 ng/L     CBC and differential [[id number]]  (Abnormal) Collected: 06/08/23 1531    Lab Status: Final result Specimen: Blood from Arm, Right Updated: 06/08/23 1544     WBC 7 23 Thousand/uL      RBC 5 97 Million/uL      Hemoglobin 17 3 g/dL      Hematocrit 50 1 %      MCV 84 fL      MCH 29 0 pg      MCHC 34 5 g/dL      RDW 13 2 %      MPV 10 5 fL      Platelets 075 Thousands/uL      nRBC 0 /100 WBCs      Neutrophils Relative 75 %      Immat GRANS % 0 %      Lymphocytes Relative 16 %      Monocytes Relative 9 %      Eosinophils Relative 0 %      Basophils Relative 0 %      Neutrophils Absolute 5 43 Thousands/µL      Immature Grans Absolute 0 02 Thousand/uL      Lymphocytes Absolute 1 14 Thousands/µL      Monocytes Absolute 0 63 Thousand/µL      Eosinophils Absolute 0 00 Thousand/µL      Basophils Absolute 0 01 Thousands/µL                  CT abdomen pelvis with contrast   ED Interpretation by Meera Rahman DO (06/08 1740)   FINDINGS:     ABDOMEN     LOWER CHEST:  No clinically significant abnormality identified in the visualized lower chest      LIVER/BILIARY TREE: Liver is diffusely decreased in density consistent with fatty change  No CT evidence of suspicious hepatic mass  Normal hepatic contours  No biliary dilatation      GALLBLADDER:  No calcified gallstones  No pericholecystic inflammatory change      SPLEEN:  Unremarkable      PANCREAS:  Unremarkable      ADRENAL GLANDS:  Unremarkable      KIDNEYS/URETERS: Nonobstructing 2 mm left-sided intrarenal calculus  No hydronephrosis      STOMACH AND BOWEL:  Unremarkable      APPENDIX: A normal appendix was visualized      ABDOMINOPELVIC CAVITY:  No ascites  No pneumoperitoneum  No lymphadenopathy      VESSELS:  Unremarkable for patient's age      PELVIS     REPRODUCTIVE ORGANS:  Unremarkable for patient's age      URINARY BLADDER:  Unremarkable      ABDOMINAL WALL/INGUINAL REGIONS:  Unremarkable      OSSEOUS STRUCTURES:  No acute fracture or destruc   tive osseous lesion      IMPRESSION:     No acute inflammatory process identified in the abdomen or pelvis      Fatty liver      Nonobstructing 2 mm left-sided intrarenal calculus without hydronephrosis  Final Result by Sadie Jin MD (06/08 1736)      No acute inflammatory process identified in the abdomen or pelvis  Fatty liver  Nonobstructing 2 mm left-sided intrarenal calculus without hydronephrosis              Workstation performed: [id number]                    Procedures  Procedures         ED Course  ED Course as of 06/09/23 0043   u Jun 08, 2023   1522 Blood Pressure(!): 209/137  Elevated, reports h/o HTN but not taking any meds/doesn't see doctor   1529 EKG independently interpreted by myself:  sinus arrhythmia, RAD, qtc 461, slight st dep inf, el avR, no previous for comparison   1557 WBC: 7 23  WNL   1630 Reassessed, feeling better, repeat abd exam with lower abd ttp   1657 Repeat EKG unchanged, no sig st/t wave changes   1720 Potassium(!): 3 0   1745 EKG independently interpreted by myself:  sinus arrhythmia @ 75 bpm, normal axis, qtc 484, no sig st cahnges   1753 Pt continues to feel well, reviewed results   1857 Delta 2hr hsTnI: 2                               SBIRT 22yo+    Flowsheet Row Most Recent Value   Initial Alcohol Screen: US AUDIT-C     1  How often do you have a drink containing alcohol? 0 Filed at: 06/08/2023 1518   2  How many drinks containing alcohol do you have on a typical day you are drinking? 0 Filed at: 06/08/2023 1518   3a  Male UNDER 65: How often do you have five or more drinks on one occasion? 0 Filed at: 06/08/2023 1518   3b  FEMALE Any Age, or MALE 65+: How often do you have 4 or more drinks on one occassion? 0 Filed at: 06/08/2023 1518   Audit-C Score 0 Filed at: 06/08/2023 1518   COLTEN: How many times in the past year have you    Used an illegal drug or used a prescription medication for non-medical reasons? Never Filed at: 06/08/2023 1518                    Medical Decision Making  41 yo M presenting for evaluation of abdominal pain/N/V- will treat sx, IVF, Protonix for possible gastritis/UGI bleed, CBC to assess for leukocytosis/anemia, CMP to assess for elevated LFTs/DOE/electrolyte abnormalities, lipase to r/o pancreatitis, CT A/P to assess for appendicitis/bowel obstruction/other pathology, dispo pending workup/reassessment    Sx much improved in ER and able to tolerate po    Discussed importance of PCP f/u for BP control/further care  Instructed to f/u with GI    Discharged with Rx for Protonix and Zofran  Return precautions reviewed and pt expressed understanding with plan    Gastritis: acute illness or injury  Hypokalemia: acute illness or injury  Nausea and vomiting: acute illness or injury  Amount and/or Complexity of Data Reviewed  External Data Reviewed: labs, radiology and notes  Labs: ordered  Decision-making details documented in ED Course  Radiology: ordered and independent interpretation performed  Decision-making details documented in ED Course  ECG/medicine tests: ordered and independent interpretation performed   Decision-making details documented in ED Course  Risk  OTC drugs  Prescription drug management  Disposition  Final diagnoses:   Nausea and vomiting   Gastritis   Hypokalemia   Elevated blood pressure reading     Time reflects when diagnosis was documented in both MDM as applicable and the Disposition within this note     Time User Action Codes Description Comment    6/8/2023  5:53 PM Jamaprasanth Barthel A Add [R11 2] Nausea and vomiting     6/8/2023  5:53 PM Priyanka Countess Add [K29 70] Gastritis     6/8/2023  5:53 PM Priyanka Countess Add [E87 6] Hypokalemia     6/8/2023  5:55 PM Priyanka Countess Add [R03 0] Elevated blood pressure reading       ED Disposition     ED Disposition   Discharge    Condition   Stable    Date/Time   Thu Jun 8, 2023  5:53 PM    Yoana Zavala  discharge to home/self care  Follow-up Information     Follow up With Specialties Details Why Contact Info Additional [address]   [address], [address] [facility] [address]  [address], [address], [address] [place], [place], [address]    [facility]   [address]  [address] [facility] [address]  Lars Lopez [address] [facility], [address], [address], [place], [place], [address] [phone]          Discharge Medication List as of 6/8/2023  6:58 PM      START taking these medications    Details   ondansetron (ZOFRAN-ODT) 4 mg disintegrating tablet Take 1 tablet (4 mg total) by mouth every 8 (eight) hours as needed for nausea for up to 10 doses, Starting Thu 6/8/2023, Print      pantoprazole (PROTONIX) 20 mg tablet Take 1 tablet (20 mg total) by mouth daily, Starting Thu 6/8/2023, Until Sat 7/8/2023, Print             No discharge procedures on file      PDMP Review     None          ED Provider  Electronically Signed by           Whit Dear,   06/09/23 6028